# Patient Record
Sex: FEMALE | Race: WHITE | NOT HISPANIC OR LATINO | Employment: UNEMPLOYED | URBAN - METROPOLITAN AREA
[De-identification: names, ages, dates, MRNs, and addresses within clinical notes are randomized per-mention and may not be internally consistent; named-entity substitution may affect disease eponyms.]

---

## 2024-04-30 ENCOUNTER — OFFICE VISIT (OUTPATIENT)
Dept: PRIMARY CARE CLINIC | Facility: CLINIC | Age: 34
End: 2024-04-30
Payer: COMMERCIAL

## 2024-04-30 VITALS
TEMPERATURE: 98 F | DIASTOLIC BLOOD PRESSURE: 77 MMHG | WEIGHT: 131 LBS | OXYGEN SATURATION: 99 % | SYSTOLIC BLOOD PRESSURE: 115 MMHG | BODY MASS INDEX: 21.05 KG/M2 | HEIGHT: 66 IN | RESPIRATION RATE: 20 BRPM | HEART RATE: 87 BPM

## 2024-04-30 DIAGNOSIS — Z00.00 HEALTH CARE MAINTENANCE: ICD-10-CM

## 2024-04-30 DIAGNOSIS — Z82.49 FAMILY HISTORY OF PREMATURE CORONARY HEART DISEASE: ICD-10-CM

## 2024-04-30 DIAGNOSIS — R22.2 SUBCUTANEOUS MASS OF BACK: ICD-10-CM

## 2024-04-30 DIAGNOSIS — Z00.00 VISIT FOR ANNUAL HEALTH EXAMINATION: ICD-10-CM

## 2024-04-30 DIAGNOSIS — Z76.89 ENCOUNTER TO ESTABLISH CARE WITH NEW DOCTOR: Primary | ICD-10-CM

## 2024-04-30 PROBLEM — F32.A ANXIETY AND DEPRESSION: Status: ACTIVE | Noted: 2024-04-30

## 2024-04-30 PROBLEM — F90.9 ADHD: Status: ACTIVE | Noted: 2024-04-30

## 2024-04-30 PROBLEM — F41.9 ANXIETY AND DEPRESSION: Status: ACTIVE | Noted: 2024-04-30

## 2024-04-30 PROCEDURE — 1160F RVW MEDS BY RX/DR IN RCRD: CPT | Mod: CPTII,,, | Performed by: FAMILY MEDICINE

## 2024-04-30 PROCEDURE — 3008F BODY MASS INDEX DOCD: CPT | Mod: CPTII,,, | Performed by: FAMILY MEDICINE

## 2024-04-30 PROCEDURE — 3074F SYST BP LT 130 MM HG: CPT | Mod: CPTII,,, | Performed by: FAMILY MEDICINE

## 2024-04-30 PROCEDURE — 99213 OFFICE O/P EST LOW 20 MIN: CPT | Mod: ,,, | Performed by: FAMILY MEDICINE

## 2024-04-30 PROCEDURE — 3078F DIAST BP <80 MM HG: CPT | Mod: CPTII,,, | Performed by: FAMILY MEDICINE

## 2024-04-30 PROCEDURE — 1159F MED LIST DOCD IN RCRD: CPT | Mod: CPTII,,, | Performed by: FAMILY MEDICINE

## 2024-04-30 NOTE — PROGRESS NOTES
Family Medicine    Patient ID: [id number]     Chief Complaint: Establish Care (Has not had a pcp, major concern is soft bump on lower back )      HPI:     Anyi Ireland is a 33 y.o. female here today to establish care.    Past Medical History:   Diagnosis Date    Anxiety     Depression         Past Surgical History:   Procedure Laterality Date    AUGMENTATION OF BREAST      VAGINAL DELIVERY      VAGINAL DELIVERY      VAGINAL DELIVERY          Social History     Tobacco Use    Smoking status: Never    Smokeless tobacco: Never   Substance and Sexual Activity    Alcohol use: Not Currently     Comment: socially    Drug use: Yes     Types: Other-see comments     Comment: THC Gummies    Sexual activity: Yes     Partners: Male        No current outpatient medications    Review of patient's allergies indicates:  No Known Allergies     Patient Care Team:  David Chau MD as PCP - General (Family Medicine)     Subjective:     Review of Systems   Constitutional:  Negative for activity change, appetite change, fever and unexpected weight change.   HENT:  Negative for congestion, rhinorrhea and sore throat.    Eyes:  Negative for visual disturbance.   Respiratory:  Negative for cough, chest tightness and shortness of breath.    Cardiovascular:  Negative for chest pain, palpitations and leg swelling.   Gastrointestinal:  Negative for abdominal pain, blood in stool, nausea and vomiting.   Genitourinary:  Negative for difficulty urinating.   Musculoskeletal:  Negative for arthralgias.   Skin:  Negative for rash.        Bump on lower back   Neurological:  Negative for dizziness, speech difficulty, weakness, light-headedness and numbness.   Psychiatric/Behavioral:  Negative for behavioral problems, confusion, dysphoric mood, self-injury, sleep disturbance and suicidal ideas.        12 point review of systems conducted, negative except as stated in the history of present illness. See HPI for details.    Objective:     Visit  "Vitals  /77   Pulse 87   Temp 98.2 °F (36.8 °C)   Resp 20   Ht 5' 6" (1.676 m)   Wt 59.4 kg (131 lb)   SpO2 99%   BMI 21.14 kg/m²       Physical Exam  Constitutional:       General: She is not in acute distress.     Appearance: Normal appearance. She is not ill-appearing.   HENT:      Head: Normocephalic and atraumatic.      Right Ear: Tympanic membrane, ear canal and external ear normal. There is no impacted cerumen.      Left Ear: Tympanic membrane, ear canal and external ear normal. There is no impacted cerumen.      Nose: No congestion.      Mouth/Throat:      Pharynx: Oropharynx is clear. No oropharyngeal exudate or posterior oropharyngeal erythema.   Eyes:      General:         Right eye: No discharge.         Left eye: No discharge.      Extraocular Movements: Extraocular movements intact.      Conjunctiva/sclera: Conjunctivae normal.   Cardiovascular:      Rate and Rhythm: Normal rate and regular rhythm.   Pulmonary:      Effort: Pulmonary effort is normal. No respiratory distress.      Breath sounds: Normal breath sounds.   Musculoskeletal:        Back:       Right lower leg: No edema.      Left lower leg: No edema.      Comments: Within the left SI joint (gluteal) dimple there is a small 1-2 mm hard mobile mass, nontender, not warm, no overlying skin changes.  The mass appears to be tethered at its upper left left and lower right quadrants with thinner, string like hard tissue.   Skin:     Capillary Refill: Capillary refill takes less than 2 seconds.   Neurological:      Mental Status: She is alert and oriented to person, place, and time. Mental status is at baseline.   Psychiatric:         Mood and Affect: Mood normal.         Behavior: Behavior normal.         Thought Content: Thought content normal.         Judgment: Judgment normal.         Labs Reviewed:     Chemistry:  No results found for: "NA", "K", "CHLORIDE", "BUN", "CREATININE", "EGFRNORACEVR", "GLUCOSE", "CALCIUM", "ALKPHOS", "LABPROT", " ""ALBUMIN", "BILIDIR", "IBILI", "AST", "ALT", "MG", "PHOS", "[id number]", "TSH", "[id number]", "PSA"     No results found for: "HGBA1C", "MICROALBCREA"     Hematology:  Lab Results   Component Value Date    WBC 14.9 (H) 07/03/2019    HGB 10.5 (L) 07/03/2019    HCT 32.1 (L) 07/03/2019     07/03/2019       Lipid Panel:  No results found for: "CHOL", "HDL", "LDL", "TRIG", "TOTALCHOLEST"     Urine:  No results found for: "COLORUA", "APPEARANCEUA", "SGUA", "PHUA", "PROTEINUA", "GLUCOSEUA", "KETONESUA", "BLOODUA", "NITRITESUA", "LEUKOCYTESUR", "RBCUA", "WBCUA", "BACTERIA", "SQEPUA", "HYALINECASTS", "CREATRANDUR", "PROTEINURINE", "UPROTCREA"     Assessment:       ICD-10-CM ICD-9-CM   1. Encounter to establish care with new doctor  Z76.89 V65.8   2. Subcutaneous mass of back  R22.2 782.2   3. Family history of premature coronary heart disease  Z82.49 V17.3   4. Health care maintenance  Z00.00 V70.0   5. Visit for annual health examination  Z00.00 V70.0        Plan:     1. Encounter to establish care with new doctor  Comments:  Patient's medical care has been established in this clinic.  All issues addressed, all questions answered,  with appropriate scheduling of follow-up care.  Orders:  -     CBC Auto Differential; Future; Expected date: 04/30/2024  -     Comprehensive Metabolic Panel; Future; Expected date: 04/30/2024  -     Lipid Panel; Future; Expected date: 04/30/2024  -     TSH; Future; Expected date: 04/30/2024  -     Hemoglobin A1C; Future; Expected date: 04/30/2024  -     Vitamin D; Future; Expected date: 04/30/2024  -     Hepatitis C Antibody; Future; Expected date: 04/30/2024  -     HIV 1/2 Ag/Ab (4th Gen); Future; Expected date: 04/30/2024    2. Subcutaneous mass of back  Overview:  Noticed 2 months ago over her left SI joint  Nontender, nonpainful  Has not noticed any growth or shrinkage  No previous history of lipomas or other growth  Small 1-2 mm mobile mass appreciated in left SI joint " medardo    Obtain ultrasound of small mass  Return in 2 weeks for further evaluation and management    Orders:  -     US Soft Tissue Lower Back; Future; Expected date: 04/30/2024    3. Family history of premature coronary heart disease  Overview:     Background:  Father had MI at age 21 and at age 36  Examined for lipids at childhood, no abnormalities found  No lipid labs as an adult yet  Current Lipid Prescriptions:  None  Asymptomatic  ASCVD risk factors include:  Family history of premature CAD     Risk Categories & Treatment Goals:      AACE/ACE Consensus Statement on Dyslipidemia Management   (https://doi.org/10.4158/PU-8703-6657)        Plan:  Get ApoB and lipid panel  Treat to above lipid targets based on risk level  Recommend Mediterranean diet  Avoid trans fats and minimize saturated fats  Repeat lipid panel + ApoB every 2 months until targets achieved    Orders:  -     Lipid Panel; Future; Expected date: 04/30/2024  -     Apolipoprotein B; Future; Expected date: 05/01/2024    4. Health care maintenance  Overview:    Breast Ca Screening (40-74):  Not applicable  Breast Ca Prevention (NIH Risk Tool) (>35):  Not applicable  Cervical (21-65):  Last Pap 2 years ago, looking for new gynecologist  Osteoporosis (QxMD OST score):  Not applicable  Colorectal (45-75):  Not applicable  LDCT (50-80): N/A  Vaccine decisions:  We will be discussed at annual  Declines:   Accepts:       Health Maintenance Due   Topic Date Due    Hepatitis C Screening  Never done    Cervical Cancer Screening  Never done    Lipid Panel  Never done    HIV Screening  Never done    TETANUS VACCINE  Never done    Influenza Vaccine (1) Never done    COVID-19 Vaccine (1 - 2023-24 season) Never done           5. Visit for annual health examination  Comments:  This diagnosis does not apply to this visit. Labs placed under this diagnosis today are for a future annual visit.  Orders:  -     CBC Auto Differential; Future; Expected date: 04/30/2024  -      Comprehensive Metabolic Panel; Future; Expected date: 04/30/2024  -     Lipid Panel; Future; Expected date: 04/30/2024  -     TSH; Future; Expected date: 04/30/2024  -     Hemoglobin A1C; Future; Expected date: 04/30/2024  -     Vitamin D; Future; Expected date: 04/30/2024  -     Hepatitis C Antibody; Future; Expected date: 04/30/2024  -     HIV 1/2 Ag/Ab (4th Gen); Future; Expected date: 04/30/2024        Follow up in about 2 weeks (around 5/14/2024) for Annual, With Labs Prior to Visit. In addition to their scheduled follow up, the patient has also been instructed to follow up on as needed basis.     Future Appointments   Date Time Provider Department Center   5/16/2024  9:00 AM NURSE, [facility] [place] PRIMARY CARE [facility] [place] [facility] Machado   5/21/2024  9:40 AM David Chau MD [facility] [place] [facility] Chau MD

## 2024-04-30 NOTE — LETTER
AUTHORIZATION FOR RELEASE OF   CONFIDENTIAL INFORMATION    Dear ,    We are seeing Anyi Ireland, date of birth 1990, in the clinic at [facility]. David Chau MD is the patient's PCP. Anyi Ireland has an outstanding lab/procedure at the time we reviewed her chart. In order to help keep her health information updated, she has authorized us to request the following medical record(s):        (  )  MAMMOGRAM                                      (  )  COLONOSCOPY      ( x )  PAP SMEAR                                          (  )  OUTSIDE LAB RESULTS     (  )  DEXA SCAN                                          (  )  EYE EXAM            (  )  FOOT EXAM                                          (  )  ENTIRE RECORD     (  )  OUTSIDE IMMUNIZATIONS                 (  )  _______________         Please fax records to [facility], [place], David, MD, [phone]     If you have any questions, please contact our office at [phone].           Patient Name: Anyi Ireland  : 1990  Patient Phone #: [phone]

## 2024-05-03 ENCOUNTER — HOSPITAL ENCOUNTER (OUTPATIENT)
Dept: RADIOLOGY | Facility: HOSPITAL | Age: 34
Discharge: HOME | End: 2024-05-03
Attending: FAMILY MEDICINE
Payer: COMMERCIAL

## 2024-05-03 DIAGNOSIS — R22.2 SUBCUTANEOUS MASS OF BACK: ICD-10-CM

## 2024-05-03 PROCEDURE — 76705 ECHO EXAM OF ABDOMEN: CPT | Mod: TC

## 2024-06-07 ENCOUNTER — CLINICAL SUPPORT (OUTPATIENT)
Dept: URGENT CARE | Facility: CLINIC | Age: 34
End: 2024-06-07
Payer: COMMERCIAL

## 2024-06-07 DIAGNOSIS — Z82.49 FAMILY HISTORY OF PREMATURE CORONARY HEART DISEASE: ICD-10-CM

## 2024-06-07 DIAGNOSIS — Z00.00 VISIT FOR ANNUAL HEALTH EXAMINATION: ICD-10-CM

## 2024-06-07 DIAGNOSIS — Z76.89 ENCOUNTER TO ESTABLISH CARE WITH NEW DOCTOR: ICD-10-CM

## 2024-06-07 LAB
25(OH)D3+25(OH)D2 SERPL-MCNC: 55 NG/ML (ref 30–80)
ALBUMIN SERPL-MCNC: 4.3 G/DL (ref 3.5–5)
ALBUMIN/GLOB SERPL: 1.7 RATIO (ref 1.1–2)
ALP SERPL-CCNC: 43 UNIT/L (ref 40–150)
ALT SERPL-CCNC: 14 UNIT/L (ref 0–55)
ANION GAP SERPL CALC-SCNC: 3 MEQ/L
AST SERPL-CCNC: 15 UNIT/L (ref 5–34)
BASOPHILS # BLD AUTO: 0.03 X10(3)/MCL
BASOPHILS NFR BLD AUTO: 0.7 %
BILIRUB SERPL-MCNC: 0.8 MG/DL
BUN SERPL-MCNC: 9.2 MG/DL (ref 7–18.7)
CALCIUM SERPL-MCNC: 9.2 MG/DL (ref 8.4–10.2)
CHLORIDE SERPL-SCNC: 105 MMOL/L (ref 98–107)
CHOLEST SERPL-MCNC: 185 MG/DL
CHOLEST/HDLC SERPL: 4 {RATIO} (ref 0–5)
CO2 SERPL-SCNC: 30 MMOL/L (ref 22–29)
CREAT SERPL-MCNC: 0.84 MG/DL (ref 0.55–1.02)
CREAT/UREA NIT SERPL: 11
EOSINOPHIL # BLD AUTO: 0.1 X10(3)/MCL (ref 0–0.9)
EOSINOPHIL NFR BLD AUTO: 2.2 %
ERYTHROCYTE [DISTWIDTH] IN BLOOD BY AUTOMATED COUNT: 12.4 % (ref 11.5–17)
EST. AVERAGE GLUCOSE BLD GHB EST-MCNC: 82.5 MG/DL
GFR SERPLBLD CREATININE-BSD FMLA CKD-EPI: >60 ML/MIN/1.73/M2
GLOBULIN SER-MCNC: 2.5 GM/DL (ref 2.4–3.5)
GLUCOSE SERPL-MCNC: 89 MG/DL (ref 74–100)
HBA1C MFR BLD: 4.5 %
HCT VFR BLD AUTO: 42.4 % (ref 37–47)
HDLC SERPL-MCNC: 47 MG/DL (ref 35–60)
HGB BLD-MCNC: 14.6 G/DL (ref 12–16)
IMM GRANULOCYTES # BLD AUTO: 0.01 X10(3)/MCL (ref 0–0.04)
IMM GRANULOCYTES NFR BLD AUTO: 0.2 %
LDLC SERPL CALC-MCNC: 121 MG/DL (ref 50–140)
LYMPHOCYTES # BLD AUTO: 1.46 X10(3)/MCL (ref 0.6–4.6)
LYMPHOCYTES NFR BLD AUTO: 32.7 %
MCH RBC QN AUTO: 29.7 PG (ref 27–31)
MCHC RBC AUTO-ENTMCNC: 34.4 G/DL (ref 33–36)
MCV RBC AUTO: 86.2 FL (ref 80–94)
MONOCYTES # BLD AUTO: 0.37 X10(3)/MCL (ref 0.1–1.3)
MONOCYTES NFR BLD AUTO: 8.3 %
NEUTROPHILS # BLD AUTO: 2.49 X10(3)/MCL (ref 2.1–9.2)
NEUTROPHILS NFR BLD AUTO: 55.9 %
NRBC BLD AUTO-RTO: 0 %
PLATELET # BLD AUTO: 289 X10(3)/MCL (ref 130–400)
PMV BLD AUTO: 9.3 FL (ref 7.4–10.4)
POTASSIUM SERPL-SCNC: 4.2 MMOL/L (ref 3.5–5.1)
PROT SERPL-MCNC: 6.8 GM/DL (ref 6.4–8.3)
RBC # BLD AUTO: 4.92 X10(6)/MCL (ref 4.2–5.4)
SODIUM SERPL-SCNC: 138 MMOL/L (ref 136–145)
TRIGL SERPL-MCNC: 87 MG/DL (ref 37–140)
TSH SERPL-ACNC: 0.69 UIU/ML (ref 0.35–4.94)
VLDLC SERPL CALC-MCNC: 17 MG/DL
WBC # SPEC AUTO: 4.46 X10(3)/MCL (ref 4.5–11.5)

## 2024-06-07 PROCEDURE — 87389 HIV-1 AG W/HIV-1&-2 AB AG IA: CPT | Performed by: FAMILY MEDICINE

## 2024-06-07 PROCEDURE — 83036 HEMOGLOBIN GLYCOSYLATED A1C: CPT | Performed by: FAMILY MEDICINE

## 2024-06-07 PROCEDURE — 84443 ASSAY THYROID STIM HORMONE: CPT | Performed by: FAMILY MEDICINE

## 2024-06-07 PROCEDURE — 36415 COLL VENOUS BLD VENIPUNCTURE: CPT

## 2024-06-07 PROCEDURE — 80053 COMPREHEN METABOLIC PANEL: CPT | Performed by: FAMILY MEDICINE

## 2024-06-07 PROCEDURE — 80061 LIPID PANEL: CPT | Performed by: FAMILY MEDICINE

## 2024-06-07 PROCEDURE — 86803 HEPATITIS C AB TEST: CPT | Performed by: FAMILY MEDICINE

## 2024-06-07 PROCEDURE — 82172 ASSAY OF APOLIPOPROTEIN: CPT | Performed by: FAMILY MEDICINE

## 2024-06-07 PROCEDURE — 82306 VITAMIN D 25 HYDROXY: CPT | Performed by: FAMILY MEDICINE

## 2024-06-07 PROCEDURE — 85025 COMPLETE CBC W/AUTO DIFF WBC: CPT | Performed by: FAMILY MEDICINE

## 2024-06-07 NOTE — PROGRESS NOTES
Pt presents to clinic for lab draw for Dr. Chau. Pt tolerated well.   Three labs were unrelease: hep c, hiv, and apolipoprotein B. I called lab and they stated that the labs should be able to be run on two gold top and two purple top tubes which is what was drawn today.

## 2024-06-08 LAB
APO B SERPL-MCNC: 84 MG/DL
HCV AB SERPL QL IA: NONREACTIVE
HIV 1+2 AB+HIV1 P24 AG SERPL QL IA: NONREACTIVE

## 2024-06-11 ENCOUNTER — OFFICE VISIT (OUTPATIENT)
Dept: PRIMARY CARE CLINIC | Facility: CLINIC | Age: 34
End: 2024-06-11
Payer: COMMERCIAL

## 2024-06-11 VITALS
TEMPERATURE: 98 F | SYSTOLIC BLOOD PRESSURE: 124 MMHG | OXYGEN SATURATION: 100 % | HEIGHT: 66 IN | WEIGHT: 130 LBS | HEART RATE: 79 BPM | DIASTOLIC BLOOD PRESSURE: 76 MMHG | BODY MASS INDEX: 20.89 KG/M2 | RESPIRATION RATE: 20 BRPM

## 2024-06-11 DIAGNOSIS — N81.4 PROLAPSED UTERUS: ICD-10-CM

## 2024-06-11 DIAGNOSIS — Z00.00 HEALTH CARE MAINTENANCE: ICD-10-CM

## 2024-06-11 DIAGNOSIS — R22.2 SUBCUTANEOUS MASS OF BACK: ICD-10-CM

## 2024-06-11 DIAGNOSIS — Z82.49 FAMILY HISTORY OF PREMATURE CORONARY HEART DISEASE: Primary | ICD-10-CM

## 2024-06-11 PROBLEM — I34.1 MVP (MITRAL VALVE PROLAPSE): Status: ACTIVE | Noted: 2024-06-11

## 2024-06-11 PROCEDURE — 3008F BODY MASS INDEX DOCD: CPT | Mod: CPTII,,, | Performed by: FAMILY MEDICINE

## 2024-06-11 PROCEDURE — 3078F DIAST BP <80 MM HG: CPT | Mod: CPTII,,, | Performed by: FAMILY MEDICINE

## 2024-06-11 PROCEDURE — 3074F SYST BP LT 130 MM HG: CPT | Mod: CPTII,,, | Performed by: FAMILY MEDICINE

## 2024-06-11 PROCEDURE — 3044F HG A1C LEVEL LT 7.0%: CPT | Mod: CPTII,,, | Performed by: FAMILY MEDICINE

## 2024-06-11 PROCEDURE — 99214 OFFICE O/P EST MOD 30 MIN: CPT | Mod: ,,, | Performed by: FAMILY MEDICINE

## 2024-06-11 PROCEDURE — 1159F MED LIST DOCD IN RCRD: CPT | Mod: CPTII,,, | Performed by: FAMILY MEDICINE

## 2024-06-11 NOTE — ASSESSMENT & PLAN NOTE
Assessment:  Very high risk category  LDL-C:  121 (55th percentile), goal below 70  Non HD, goal below 100  ApoB:  84 (30th percentile), goal below 80  Significant discordance present  Close to goal with ApoB    Plan:  Given discordance, follow ApoB  Recommend Mediterranean diet  Avoid trans fats and ultra processed foods, minimize saturated  Repeat ApoB plus lipids 1 year

## 2024-06-11 NOTE — ASSESSMENT & PLAN NOTE
Discussed ultrasound results today and differential  Discussed importance of lack of vascularity  Discussed management options going forward including watchful waiting and surgical consult  Patient opts for monitoring at this point  Consider surgical consult if symptomatic or growth

## 2024-06-11 NOTE — PROGRESS NOTES
"  Family Medicine    Patient ID: [id number]     Chief Complaint: Annual Exam (No concerns/)      HPI:     Anyi Ireland is a 34 y.o. female here today for a follow up.     Past Medical History:   Diagnosis Date    Anxiety     Depression         Past Surgical History:   Procedure Laterality Date    AUGMENTATION OF BREAST      VAGINAL DELIVERY      VAGINAL DELIVERY      VAGINAL DELIVERY          Social History     Tobacco Use    Smoking status: Never    Smokeless tobacco: Never   Substance and Sexual Activity    Alcohol use: Not Currently     Comment: socially    Drug use: Yes     Types: Other-see comments     Comment: THC Gummies    Sexual activity: Yes     Partners: Male        No current outpatient medications    Review of patient's allergies indicates:  No Known Allergies     Patient Care Team:  David Chau MD as PCP - General (Family Medicine)     Subjective:     Review of Systems   Constitutional:  Negative for activity change, appetite change, fever and unexpected weight change.   HENT:  Negative for congestion, rhinorrhea and sore throat.    Eyes:  Negative for visual disturbance.   Respiratory:  Negative for cough, chest tightness and shortness of breath.    Cardiovascular:  Negative for chest pain, palpitations and leg swelling.   Gastrointestinal:  Negative for abdominal pain, blood in stool, nausea and vomiting.   Genitourinary:  Negative for difficulty urinating.   Musculoskeletal:  Negative for arthralgias.   Skin:  Negative for rash.   Neurological:  Negative for dizziness, speech difficulty, weakness, light-headedness and numbness.   Psychiatric/Behavioral:  Negative for behavioral problems, confusion, dysphoric mood, self-injury, sleep disturbance and suicidal ideas.        12 point review of systems conducted, negative except as stated in the history of present illness. See HPI for details.    Objective:     Visit Vitals  /76   Pulse 79   Temp 98 °F (36.7 °C)   Resp 20   Ht 5' 6" (1.676 m) "   Wt 59 kg (130 lb)   SpO2 100%   BMI 20.98 kg/m²       Physical Exam  Constitutional:       General: She is not in acute distress.     Appearance: Normal appearance. She is not ill-appearing.   HENT:      Head: Normocephalic and atraumatic.      Right Ear: Tympanic membrane, ear canal and external ear normal. There is no impacted cerumen.      Left Ear: Tympanic membrane, ear canal and external ear normal. There is no impacted cerumen.      Nose: No congestion.      Mouth/Throat:      Pharynx: Oropharynx is clear. No oropharyngeal exudate or posterior oropharyngeal erythema.   Eyes:      General:         Right eye: No discharge.         Left eye: No discharge.      Extraocular Movements: Extraocular movements intact.      Conjunctiva/sclera: Conjunctivae normal.   Cardiovascular:      Rate and Rhythm: Normal rate and regular rhythm.   Pulmonary:      Effort: Pulmonary effort is normal. No respiratory distress.      Breath sounds: Normal breath sounds.   Musculoskeletal:      Right lower leg: No edema.      Left lower leg: No edema.   Skin:     Capillary Refill: Capillary refill takes less than 2 seconds.   Neurological:      Mental Status: She is alert and oriented to person, place, and time. Mental status is at baseline.   Psychiatric:         Mood and Affect: Mood normal.         Behavior: Behavior normal.         Thought Content: Thought content normal.         Judgment: Judgment normal.         Labs Reviewed:     Chemistry:  Lab Results   Component Value Date     06/07/2024    K 4.2 06/07/2024    BUN 9.2 06/07/2024    CREATININE 0.84 06/07/2024    EGFRNORACEVR >60 06/07/2024    GLUCOSE 89 06/07/2024    CALCIUM 9.2 06/07/2024    ALKPHOS 43 06/07/2024    LABPROT 6.8 06/07/2024    ALBUMIN 4.3 06/07/2024    AST 15 06/07/2024    ALT 14 06/07/2024    [id number] 55 06/07/2024    TSH 0.689 06/07/2024        Lab Results   Component Value Date    HGBA1C 4.5 06/07/2024        Hematology:  Lab Results   Component  "Value Date    WBC 4.46 (L) 2024    HGB 14.6 2024    HCT 42.4 2024     2024       Lipid Panel:  Lab Results   Component Value Date    CHOL 185 2024    HDL 47 2024    .00 2024    TRIG 87 2024    TOTALCHOLEST 4 2024        Urine:  No results found for: "COLORUA", "APPEARANCEUA", "SGUA", "PHUA", "PROTEINUA", "GLUCOSEUA", "KETONESUA", "BLOODUA", "NITRITESUA", "LEUKOCYTESUR", "RBCUA", "WBCUA", "BACTERIA", "SQEPUA", "HYALINECASTS", "CREATRANDUR", "PROTEINURINE", "UPROTCREA"     Assessment:       ICD-10-CM ICD-9-CM   1. Family history of premature coronary heart disease  Z82.49 V17.3   2. Subcutaneous mass of back  R22.2 782.2   3. Health care maintenance  Z00.00 V70.0   4. Prolapsed uterus  N81.4 618.1        Plan:     1. Family history of premature coronary heart disease  Overview:     Background:  Father had MI at age 21 and at age 36  Examined for lipids at childhood, no abnormalities found  No lipid labs as an adult yet  Current Lipid Prescriptions:  None  Asymptomatic  ASCVD risk factors include:  Family history of premature CAD     Risk Categories & Treatment Goals:      AACE/ACE Consensus Statement on Dyslipidemia Management   (https://doi.org/[email or web address]/[email or web address])          Assessment & Plan:  Assessment:  Very high risk category  LDL-C:  121 (55th percentile), goal below 70  Non HD, goal below 100  ApoB:  84 (30th percentile), goal below 80  Significant discordance present  Close to goal with ApoB    Plan:  Given discordance, follow ApoB  Recommend Mediterranean diet  Avoid trans fats and ultra processed foods, minimize saturated  Repeat ApoB plus lipids 1 year      2. Subcutaneous mass of back  Overview:  Noticed 2 months ago over her left SI joint  Nontender, nonpainful  Has not noticed any growth or shrinkage  No previous history of lipomas or other growth  Small 1-2 mm mobile mass appreciated in left SI joint dimple  Ultrasound shows " likely lipoma    Assessment & Plan:  Discussed ultrasound results today and differential  Discussed importance of lack of vascularity  Discussed management options going forward including watchful waiting and surgical consult  Patient opts for monitoring at this point  Consider surgical consult if symptomatic or growth      3. Health care maintenance  Overview:    Breast Ca Screening (40-74):  Not applicable  Breast Ca Prevention (NIH Risk Tool) (>35):  7.5% lifetime risk (low)  Cervical (21-65):  Last Pap 2 years ago, still looking for new gynecologist  Osteoporosis (QxMD OST score):  Not applicable  Colorectal (45-75):  Not applicable  LDCT (50-80): Minimal smoker early 20s  Vaccine decisions:  We will be discussed at annual  Declines: TD, Covid  Accepts:       Health Maintenance Due   Topic Date Due    Hepatitis C Screening  Never done    Cervical Cancer Screening  Never done    Lipid Panel  Never done    HIV Screening  Never done    TETANUS VACCINE  Never done    Influenza Vaccine (1) Never done    COVID-19 Vaccine (1 - 2023-24 season) Never done           4. Prolapsed uterus  Overview:  Told by her OB that her uterus is prolapsed  Advised that she will eventually need surgery for this  Asymptomatic currently  We would like to talk to a specialist about this topic    Assessment & Plan:  We will refer to urogynecological surgery her to have a conversation    Orders:  -     Ambulatory referral/consult to Urogynecology; Future; Expected date: 06/18/2024         No follow-ups on file. In addition to their scheduled follow up, the patient has also been instructed to follow up on as needed basis.     Future Appointments   Date Time Provider Department Center   6/10/2025  8:00 AM NURSE, [facility] [place] PRIMARY CARE [facility] [place] [facility] Machado   6/17/2025  9:40 AM David Chau MD [facility] [place] [facility] Chau MD

## 2024-09-05 ENCOUNTER — DOCUMENTATION ONLY (OUTPATIENT)
Facility: CLINIC | Age: 34
End: 2024-09-05
Payer: COMMERCIAL

## 2024-09-16 PROBLEM — Z00.00 HEALTH CARE MAINTENANCE: Status: RESOLVED | Noted: 2024-04-30 | Resolved: 2024-09-16

## 2024-10-25 ENCOUNTER — OFFICE VISIT (OUTPATIENT)
Dept: URGENT CARE | Facility: CLINIC | Age: 34
End: 2024-10-25
Payer: COMMERCIAL

## 2024-10-25 VITALS
HEART RATE: 81 BPM | RESPIRATION RATE: 18 BRPM | OXYGEN SATURATION: 100 % | SYSTOLIC BLOOD PRESSURE: 122 MMHG | DIASTOLIC BLOOD PRESSURE: 83 MMHG | WEIGHT: 137 LBS | BODY MASS INDEX: 22.02 KG/M2 | TEMPERATURE: 98 F | HEIGHT: 66 IN

## 2024-10-25 DIAGNOSIS — R30.0 DYSURIA: ICD-10-CM

## 2024-10-25 DIAGNOSIS — N39.0 URINARY TRACT INFECTION WITHOUT HEMATURIA, SITE UNSPECIFIED: Primary | ICD-10-CM

## 2024-10-25 LAB
BILIRUB UR QL STRIP: NEGATIVE
GLUCOSE UR QL STRIP: NEGATIVE
KETONES UR QL STRIP: NEGATIVE
LEUKOCYTE ESTERASE UR QL STRIP: POSITIVE
PH, POC UA: 6
POC BLOOD, URINE: POSITIVE
POC NITRATES, URINE: NEGATIVE
PROT UR QL STRIP: POSITIVE
SP GR UR STRIP: 1.02 (ref 1–1.03)
UROBILINOGEN UR STRIP-ACNC: NORMAL (ref 0.1–1.1)

## 2024-10-25 RX ORDER — PHENAZOPYRIDINE HYDROCHLORIDE 200 MG/1
200 TABLET, FILM COATED ORAL 3 TIMES DAILY PRN
Qty: 9 TABLET | Refills: 0 | Status: SHIPPED | OUTPATIENT
Start: 2024-10-25 | End: 2024-10-28

## 2024-10-25 RX ORDER — NITROFURANTOIN 25; 75 MG/1; MG/1
100 CAPSULE ORAL 2 TIMES DAILY
Qty: 14 CAPSULE | Refills: 0 | Status: SHIPPED | OUTPATIENT
Start: 2024-10-25 | End: 2024-11-01

## 2024-10-25 NOTE — PROGRESS NOTES
"     Previous History      Review of patient's allergies indicates:  No Known Allergies    Past Medical History:   Diagnosis Date    Anxiety     Depression      Current Outpatient Medications   Medication Instructions    nitrofurantoin, macrocrystal-monohydrate, (MACROBID) 100 MG capsule 100 mg, Oral, 2 times daily    phenazopyridine (PYRIDIUM) 200 mg, Oral, 3 times daily PRN     Past Surgical History:   Procedure Laterality Date    AUGMENTATION OF BREAST      VAGINAL DELIVERY      VAGINAL DELIVERY      VAGINAL DELIVERY       Family History   Problem Relation Name Age of Onset    Hypertension Mother      Hypertension Father      Heart attack Father      Heart attacks under age 50 Father      Alzheimer's disease Maternal Grandfather      Pacemaker/defibrilator Paternal Grandmother      Multiple sclerosis Paternal Grandfather         Social History     Tobacco Use    Smoking status: Never    Smokeless tobacco: Never   Substance Use Topics    Alcohol use: Not Currently     Comment: socially    Drug use: Yes     Types: Other-see comments     Comment: THC Gummies        Physical Exam      Vital Signs Reviewed   /83   Pulse 81   Temp 97.5 °F (36.4 °C) (Oral)   Resp 18   Ht 5' 6" (1.676 m)   Wt 62.1 kg (137 lb)   LMP 10/20/2024   SpO2 100%   BMI 22.11 kg/m²        Procedures    Procedures     Labs     Results for orders placed or performed in visit on 10/25/24   POCT Urinalysis, Dipstick, Manual, W/O Scope    Collection Time: 10/25/24  8:59 AM   Result Value Ref Range    POC Blood, Urine Positive (A) Negative    POC Bilirubin, Urine Negative Negative    POC Urobilinogen, Urine normal 0.1 - 1.1    POC Ketones, Urine Negative Negative    POC Protein, Urine Positive (A) Negative    POC Nitrates, Urine Negative Negative    POC Glucose, Urine Negative Negative    pH, UA 6     POC Specific Gravity, Urine 1.025 1.003 - 1.029    POC Leukocytes, Urine Positive (A) Negative   Subjective:      Patient ID: Anyi IRBY Shu " "is a 34 y.o. female.    Vitals:  height is 5' 6" (1.676 m) and weight is 62.1 kg (137 lb). Her oral temperature is 97.5 °F (36.4 °C). Her blood pressure is 122/83 and her pulse is 81. Her respiration is 18 and oxygen saturation is 100%.     Chief Complaint: Dysuria     Patient is a 34 y.o. female who presents to urgent care with complaints of urinary frequency, lower abdominal pain x today. Alleviating factors include Azo with mild amount of relief. Patient denies urinary burning.        Constitution: Negative. Negative for fever.   HENT: Negative.     Neck: neck negative.   Cardiovascular: Negative.    Eyes: Negative.    Respiratory: Negative.     Gastrointestinal: Negative.  Negative for abdominal pain.   Endocrine: negative.   Genitourinary:  Positive for dysuria, frequency and urgency.   Musculoskeletal: Negative.  Negative for back pain.   Skin: Negative.    Allergic/Immunologic: Negative.    Neurological: Negative.    Hematologic/Lymphatic: Negative.    Psychiatric/Behavioral: Negative.        Objective:     Physical Exam   Constitutional: She is oriented to person, place, and time. She appears well-developed. She is cooperative.   HENT:   Head: Normocephalic and atraumatic.   Ears:   Right Ear: Hearing, tympanic membrane, external ear and ear canal normal.   Left Ear: Hearing, tympanic membrane, external ear and ear canal normal.   Nose: Nose normal. No mucosal edema or nasal deformity. No epistaxis. Right sinus exhibits no maxillary sinus tenderness and no frontal sinus tenderness. Left sinus exhibits no maxillary sinus tenderness and no frontal sinus tenderness.   Mouth/Throat: Uvula is midline, oropharynx is clear and moist and mucous membranes are normal. No trismus in the jaw. Normal dentition. No uvula swelling.   Eyes: Conjunctivae and lids are normal.   Neck: Trachea normal and phonation normal. Neck supple.   Cardiovascular: Normal rate, regular rhythm, normal heart sounds and normal pulses. "   Pulmonary/Chest: Effort normal and breath sounds normal.   Abdominal: Normal appearance and bowel sounds are normal. Soft.   Musculoskeletal: Normal range of motion.         General: Normal range of motion.   Neurological: She is alert and oriented to person, place, and time. She exhibits normal muscle tone.   Skin: Skin is warm, dry and intact.   Psychiatric: Her speech is normal and behavior is normal. Judgment and thought content normal.   Nursing note and vitals reviewed.      Assessment:     1. Urinary tract infection without hematuria, site unspecified    2. Dysuria        Plan:     A urinary tract infection (UTI) is caused by bacteria that get inside your urinary tract. Your urinary tract includes your kidneys, ureters, bladder, and urethra. A UTI is more common in your lower urinary tract, which includes your bladder and urethra.      DISCHARGE INSTRUCTIONS:  Seek care immediately if:    You are urinating very little or not at all.  You have a high fever with shaking chills.  You have side or back pain that gets worse.  Call your doctor if:  You have a fever.  You do not feel better after 2 days of taking antibiotics.  You have new symptoms, such as blood or pus in your urine.  You are vomiting.  You have questions or concerns about your condition or care.    Medicines:  Antibiotics treat a bacterial infection. If you have UTIs often (called recurrent UTIs), you may be given antibiotics to take regularly. You will be given directions for when and how to use antibiotics. The goal is to prevent UTIs but not cause antibiotic resistance by using antibiotics too often.  Medicines may be given to decrease pain and burning when you urinate. They will also help decrease the feeling that you need to urinate often. These medicines may make your urine orange or red.  Take your medicine as directed. Contact your healthcare provider if you think your medicine is not helping or if you have side effects. Tell your  provider if you are allergic to any medicine. Keep a list of the medicines, vitamins, and herbs you take. Include the amounts, and when and why you take them. Bring the list or the pill bottles to follow-up visits. Carry your medicine list with you in case of an emergency.  Follow up with your doctor as directed:  Write down your questions so you remember to ask them during your visits.    Prevent another UTI:  Empty your bladder often. Urinate and empty your bladder as soon as you feel the need. Do not hold your urine for long periods of time.  Wipe from front to back after you urinate or have a bowel movement. This will help prevent germs from getting into your urinary tract through your urethra.  Drink liquids as directed. Ask how much liquid to drink each day and which liquids are best for you. You may need to drink more liquids than usual to help flush out the bacteria. Do not drink alcohol, caffeine, or citrus juices. These can irritate your bladder and increase your symptoms. Your healthcare provider may recommend cranberry juice to help prevent a UTI.  Urinate before and after you have sex. This can help flush out bacteria passed during sex.  Do not douche or use feminine deodorants. These can change the chemical balance in your vagina.  Change sanitary pads or tampons often. This will help prevent germs from getting into your urinary tract.  Talk to your healthcare provider about your birth control method. You may need to change your method if it is increasing your risk for UTIs.  Wear cotton underwear and clothes that are loose. Tight pants and nylon underwear can trap moisture and cause bacteria to grow.  Vaginal estrogen may be recommended. This medicine helps prevent UTIs in women who have gone through menopause or are in amol-menopause.  Do pelvic muscle exercises often. Pelvic muscle exercises may help you start and stop urinating. Strong pelvic muscles may help you empty your bladder easier. Squeeze  these muscles tightly for 5 seconds like you are trying to hold back urine. Then relax for 5 seconds. Gradually work up to squeezing for 10 seconds. Do 3 sets of 15 repetitions a day, or as directed.     Urinary tract infection without hematuria, site unspecified  -     nitrofurantoin, macrocrystal-monohydrate, (MACROBID) 100 MG capsule; Take 1 capsule (100 mg total) by mouth 2 (two) times daily. for 7 days  Dispense: 14 capsule; Refill: 0  -     phenazopyridine (PYRIDIUM) 200 MG tablet; Take 1 tablet (200 mg total) by mouth 3 (three) times daily as needed.  Dispense: 9 tablet; Refill: 0    Dysuria  -     POCT Urinalysis, Dipstick, Manual, W/O Scope  -     nitrofurantoin, macrocrystal-monohydrate, (MACROBID) 100 MG capsule; Take 1 capsule (100 mg total) by mouth 2 (two) times daily. for 7 days  Dispense: 14 capsule; Refill: 0  -     phenazopyridine (PYRIDIUM) 200 MG tablet; Take 1 tablet (200 mg total) by mouth 3 (three) times daily as needed.  Dispense: 9 tablet; Refill: 0

## 2025-01-07 LAB
PAP RECOMMENDATION EXT: NORMAL
PAP SMEAR: NORMAL

## 2025-03-27 ENCOUNTER — DOCUMENTATION ONLY (OUTPATIENT)
Dept: PRIMARY CARE CLINIC | Facility: CLINIC | Age: 35
End: 2025-03-27
Payer: COMMERCIAL

## 2025-03-27 NOTE — PROGRESS NOTES
Subjective:      Patient ID: Anyi Ireland is a 34 y.o. female.    Vitals:  vitals were not taken for this visit.     Chief Complaint: No chief complaint on file.    HPI  ROS   Objective:     Physical Exam    Assessment:     No diagnosis found.    Plan:       There are no diagnoses linked to this encounter.

## 2025-04-13 ENCOUNTER — OFFICE VISIT (OUTPATIENT)
Dept: URGENT CARE | Facility: CLINIC | Age: 35
End: 2025-04-13
Payer: COMMERCIAL

## 2025-04-13 VITALS
SYSTOLIC BLOOD PRESSURE: 121 MMHG | OXYGEN SATURATION: 100 % | RESPIRATION RATE: 18 BRPM | TEMPERATURE: 99 F | DIASTOLIC BLOOD PRESSURE: 78 MMHG | HEART RATE: 92 BPM | BODY MASS INDEX: 22.5 KG/M2 | HEIGHT: 66 IN | WEIGHT: 140 LBS

## 2025-04-13 DIAGNOSIS — H18.821 CORNEAL ABRASION DUE TO CONTACT LENS, RIGHT: Primary | ICD-10-CM

## 2025-04-13 PROCEDURE — 99213 OFFICE O/P EST LOW 20 MIN: CPT | Mod: ,,,

## 2025-04-13 RX ORDER — OFLOXACIN 3 MG/ML
2 SOLUTION/ DROPS OPHTHALMIC 4 TIMES DAILY
Qty: 10 ML | Refills: 0 | Status: SHIPPED | OUTPATIENT
Start: 2025-04-13 | End: 2025-04-20

## 2025-04-13 NOTE — PROGRESS NOTES
"Subjective:      Patient ID: Anyi Ireland is a 34 y.o. female.    Vitals:  height is 5' 6" (1.676 m) and weight is 63.5 kg (140 lb). Her temperature is 98.7 °F (37.1 °C). Her blood pressure is 121/78 and her pulse is 92. Her respiration is 18 and oxygen saturation is 100%.     Chief Complaint: Eye Pain     Patient is a 34 y.o. female contact lens wear complaints of right eye irritation after removing contacts 2 nights ago.  She does describe conjunctival injection, watering, photophobia.  Denies any visual changes.  Alleviating factors include Lumify with mild amount of relief.  She does report 15 years ago she had corneal ulcer abrasion from contact lens wear however has not had 1 since that time.      ROS   Objective:     Physical Exam   Constitutional: She is oriented to person, place, and time. She appears well-developed.   HENT:   Head: Normocephalic and atraumatic.   Ears:   Right Ear: External ear normal.   Left Ear: External ear normal.   Nose: Nose normal.   Mouth/Throat: Oropharynx is clear and moist.   Eyes: EOM and lids are normal. Pupils are equal, round, and reactive to light. Lids are everted and swept, no foreign bodies found. Right eye exhibits no chemosis, no discharge, no exudate and no hordeolum. No foreign body present in the right eye. Left eye exhibits no discharge and no hordeolum. No foreign body present in the left eye. Right conjunctiva is injected.   Slit lamp exam:       The right eye shows corneal abrasion and fluorescein uptake.          Comments: Wood's lamp examination, proparacaine drops and fluorescein, appreciate small punctate fluorescein uptake, corneal abrasion near the 6 o'clock position of the right eye   Neck: Trachea normal and phonation normal. Neck supple.   Musculoskeletal: Normal range of motion.         General: Normal range of motion.   Neurological: She is alert and oriented to person, place, and time.   Skin: Skin is warm, dry and intact.   Psychiatric: Her speech " is normal and behavior is normal. Judgment and thought content normal.   Nursing note and vitals reviewed.      Assessment:     1. Corneal abrasion due to contact lens, right        Plan:       Corneal abrasion due to contact lens, right  -     ofloxacin (OCUFLOX) 0.3 % ophthalmic solution; Place 2 drops into the right eye 4 (four) times daily. for 7 days  Dispense: 10 mL; Refill: 0    Throw away your contacts and begin with a new pair.  Do not wear contacts while using prescription drops until the abrasion heals.  Do not put anything in the eye.  Discussed corneal abrasion versus corneal ulcer however most consistent with an abrasion in clinic at this time.  Stressed importance of frequent handwashing and avoidance of touching the eye area.  Follow-up with your Primary Care Provider or Eye Doctor as needed.   Present to the Emergency Department with any significant change or worsening symptoms including facial swelling, pain, vision changes, fever, body aches, or chills.

## 2025-04-13 NOTE — PATIENT INSTRUCTIONS
Throw away your contacts and begin with a new pair.  Do not wear contacts while using prescription drops until the abrasion heals.  Do not put anything in the eye.  Discussed corneal abrasion versus corneal ulcer however most consistent with an abrasion in clinic at this time.  Stressed importance of frequent handwashing and avoidance of touching the eye area.  Follow-up with your Primary Care Provider or Eye Doctor as needed.   Present to the Emergency Department with any significant change or worsening symptoms including facial swelling, pain, vision changes, fever, body aches, or chills.